# Patient Record
Sex: FEMALE | Race: WHITE | Employment: UNEMPLOYED | URBAN - METROPOLITAN AREA
[De-identification: names, ages, dates, MRNs, and addresses within clinical notes are randomized per-mention and may not be internally consistent; named-entity substitution may affect disease eponyms.]

---

## 2023-11-06 PROBLEM — I31.9: Status: ACTIVE | Noted: 2023-11-06

## 2023-11-06 PROBLEM — I42.0 DILATED CARDIOMYOPATHY (MULTI): Status: ACTIVE | Noted: 2023-11-06

## 2023-11-06 PROBLEM — R07.89 CHEST DISCOMFORT: Status: ACTIVE | Noted: 2023-11-06

## 2023-11-06 PROBLEM — I10 ESSENTIAL (PRIMARY) HYPERTENSION: Status: ACTIVE | Noted: 2023-11-06

## 2023-11-06 RX ORDER — NADOLOL 40 MG/1
TABLET ORAL
COMMUNITY

## 2023-11-06 RX ORDER — OXCARBAZEPINE 150 MG/1
1 TABLET, FILM COATED ORAL DAILY
COMMUNITY
End: 2023-11-07 | Stop reason: WASHOUT

## 2023-11-06 RX ORDER — CALCIUM CARBONATE/VITAMIN D3 500-10/5ML
LIQUID (ML) ORAL
COMMUNITY

## 2023-11-06 RX ORDER — ACETAMINOPHEN 500 MG
TABLET ORAL
COMMUNITY

## 2023-11-06 RX ORDER — FOLIC ACID 1 MG/1
1 TABLET ORAL DAILY
COMMUNITY

## 2023-11-06 RX ORDER — ONDANSETRON 4 MG/1
TABLET, FILM COATED ORAL
COMMUNITY

## 2023-11-06 RX ORDER — CYCLOBENZAPRINE HCL 10 MG
TABLET ORAL
COMMUNITY

## 2023-11-06 RX ORDER — FAMOTIDINE 20 MG/1
TABLET, FILM COATED ORAL
COMMUNITY

## 2023-11-06 RX ORDER — IBUPROFEN 100 MG/5ML
SUSPENSION, ORAL (FINAL DOSE FORM) ORAL
COMMUNITY

## 2023-11-06 RX ORDER — LEVOTHYROXINE SODIUM 75 UG/1
1 TABLET ORAL DAILY
COMMUNITY

## 2023-11-06 RX ORDER — AMITRIPTYLINE HYDROCHLORIDE 50 MG/1
TABLET, FILM COATED ORAL
COMMUNITY

## 2023-11-06 RX ORDER — FLUOXETINE HYDROCHLORIDE 20 MG/1
CAPSULE ORAL
COMMUNITY
End: 2023-11-07 | Stop reason: WASHOUT

## 2023-11-06 RX ORDER — SULFAMETHOXAZOLE AND TRIMETHOPRIM 800; 160 MG/1; MG/1
TABLET ORAL
COMMUNITY

## 2023-11-06 RX ORDER — HYDROXYZINE HYDROCHLORIDE 25 MG/1
TABLET, FILM COATED ORAL
COMMUNITY
End: 2023-11-07 | Stop reason: WASHOUT

## 2023-11-06 RX ORDER — LORATADINE 10 MG/1
CAPSULE, LIQUID FILLED ORAL
COMMUNITY

## 2023-11-06 RX ORDER — SPIRONOLACTONE 50 MG/1
1 TABLET, FILM COATED ORAL DAILY
COMMUNITY
Start: 2022-01-24

## 2023-11-06 RX ORDER — LEVONORGESTREL 52 MG/1
INTRAUTERINE DEVICE INTRAUTERINE
COMMUNITY

## 2023-11-06 RX ORDER — DOCUSATE SODIUM 100 MG/1
CAPSULE, LIQUID FILLED ORAL
COMMUNITY

## 2023-11-06 RX ORDER — AMLODIPINE BESYLATE 5 MG/1
1 TABLET ORAL DAILY
COMMUNITY

## 2023-11-06 RX ORDER — POTASSIUM CHLORIDE 20 MEQ/1
TABLET, EXTENDED RELEASE ORAL
COMMUNITY

## 2023-11-06 RX ORDER — CHLORTHALIDONE 25 MG/1
1 TABLET ORAL DAILY
COMMUNITY

## 2023-11-06 RX ORDER — IBUPROFEN 600 MG/1
TABLET ORAL
COMMUNITY

## 2023-11-07 ENCOUNTER — OFFICE VISIT (OUTPATIENT)
Dept: CARDIOLOGY | Facility: CLINIC | Age: 43
End: 2023-11-07
Payer: COMMERCIAL

## 2023-11-07 VITALS
HEIGHT: 63 IN | SYSTOLIC BLOOD PRESSURE: 106 MMHG | HEART RATE: 96 BPM | WEIGHT: 157 LBS | BODY MASS INDEX: 27.82 KG/M2 | TEMPERATURE: 97.9 F | DIASTOLIC BLOOD PRESSURE: 69 MMHG

## 2023-11-07 DIAGNOSIS — R07.89 OTHER CHEST PAIN: ICD-10-CM

## 2023-11-07 DIAGNOSIS — I30.9 ACUTE PERICARDITIS, UNSPECIFIED TYPE (HHS-HCC): Primary | ICD-10-CM

## 2023-11-07 DIAGNOSIS — R06.02 SHORTNESS OF BREATH: ICD-10-CM

## 2023-11-07 PROCEDURE — 1036F TOBACCO NON-USER: CPT | Performed by: NURSE PRACTITIONER

## 2023-11-07 PROCEDURE — 3074F SYST BP LT 130 MM HG: CPT | Performed by: NURSE PRACTITIONER

## 2023-11-07 PROCEDURE — 99214 OFFICE O/P EST MOD 30 MIN: CPT | Performed by: NURSE PRACTITIONER

## 2023-11-07 PROCEDURE — 3078F DIAST BP <80 MM HG: CPT | Performed by: NURSE PRACTITIONER

## 2023-11-07 RX ORDER — COLCHICINE 0.6 MG/1
0.6 TABLET ORAL 2 TIMES DAILY
Qty: 180 TABLET | Refills: 0 | Status: SHIPPED | OUTPATIENT
Start: 2023-11-07 | End: 2023-12-18 | Stop reason: SDUPTHER

## 2023-11-07 RX ORDER — OMEPRAZOLE 20 MG/1
40 TABLET, DELAYED RELEASE ORAL
Qty: 28 TABLET | Refills: 0 | Status: SHIPPED | OUTPATIENT
Start: 2023-11-07 | End: 2023-12-18 | Stop reason: WASHOUT

## 2023-11-07 RX ORDER — IBUPROFEN 800 MG/1
800 TABLET ORAL EVERY 8 HOURS PRN
Qty: 42 TABLET | Refills: 0 | Status: SHIPPED | OUTPATIENT
Start: 2023-11-07 | End: 2023-11-21

## 2023-11-07 NOTE — PROGRESS NOTES
"Chief Complaint:   No chief complaint on file.     History Of Present Illness:    Roxane Lees is a 43 y.o. female here with Chest Pain.  On chemo injection: vasculitis. It is working.    Feels like has pericarditis. Symptoms consistent with when she had pericarditis in the past.   She c/o chest pressure. Feels like someone is pushing on her chest. Reports an increase in SOB. Inhaler has not helped symptoms. Reports that chest pain is worse with laying down. Sitting certain ways reports an increase in SOB. Denies fever.     Denies LE edema, palpitations and syncope.      Allergies:  Patient has no known allergies.    Review of Systems  All pertinent systems have been reviewed and are negative except for what is stated in the history of present illness.    All other systems have been reviewed and are negative and noncontributory to this patient's current ailments.     Visit Vitals  /69 (BP Location: Right arm)   Pulse 96   Temp 36.6 °C (97.9 °F)   Ht 1.6 m (5' 3\")   Wt 71.2 kg (157 lb)   BMI 27.81 kg/m²   Smoking Status Never   BSA 1.78 m²       Objective   Vitals reviewed.   Constitutional:       Appearance: Healthy appearance. Not in distress.   Neck:      Vascular: No JVR. JVD normal.   Pulmonary:      Effort: Pulmonary effort is normal.      Breath sounds: Normal breath sounds. No wheezing. No rhonchi. No rales.   Chest:      Chest wall: Not tender to palpatation.   Cardiovascular:      PMI at left midclavicular line. Normal rate. Regular rhythm. Normal S1. Normal S2.       Murmurs: There is no murmur.      No gallop.  No click. No rub.   Edema:     Peripheral edema absent.   Abdominal:      General: Bowel sounds are normal.      Palpations: Abdomen is soft.      Tenderness: There is no abdominal tenderness.   Musculoskeletal: Normal range of motion.         General: No tenderness. Skin:     General: Skin is warm and dry.   Neurological:      General: No focal deficit present.      Mental Status: Alert and " oriented to person, place and time.       Assessment/Plan   Diagnoses and all orders for this visit:  Acute pericarditis, unspecified type  - Esed, CRP and WBC elevated consistent with pericarditis  - no rub ausculted on exam  - no evidence of tamponade  - starting ibuprofen 800mg TID for 2 weeks with omeprazole  - starting colchicine 0.6mg BID for 3 months  - has had pericarditis in the past  - if continues to re-occur we may want to keep her on long term colchicine   Other chest pain  - 2/2 pericarditis  Shortness of breath  - suspect due to pericarditis and asthma  - cannot exclude other CV cause as patient is on chronic chemo  - we will check echo    Outpatient Medications:  Current Outpatient Medications   Medication Instructions    amitriptyline (Elavil) 50 mg tablet oral    amLODIPine (Norvasc) 5 mg tablet 1 tablet, oral, Daily    ascorbic acid (Vitamin C) 1,000 mg tablet oral    cetirizine (ZyrTEC) 10 mg capsule oral    chlorthalidone (Hygroton) 25 mg tablet 1 tablet, oral, Daily    cholecalciferol (Vitamin D-3) 50 mcg (2,000 unit) capsule oral    colchicine 0.6 mg, oral, 2 times daily    cyclobenzaprine (Flexeril) 10 mg tablet oral    docusate sodium (Colace) 100 mg capsule oral    famotidine (Pepcid) 20 mg tablet oral    folic acid (Folvite) 1 mg tablet 1 tablet, oral, Daily    ibuprofen 600 mg tablet oral    ibuprofen 800 mg, oral, Every 8 hours PRN    levonorgestrel (Mirena) 21 mcg/24 hours (8 yrs) 52 mg IUD     levothyroxine (Synthroid) 75 mcg tablet 1 tablet, oral, Daily    linaCLOtide (Linzess) 145 mcg capsule oral    loratadine (Claritin Liqui-Gel) 10 mg capsule oral    magnesium oxide 400 mg magnesium capsule oral    nadolol (Corgard) 40 mg tablet oral    omeprazole OTC (PRILOSEC OTC) 40 mg, oral, Daily before breakfast, Do not crush, chew, or split.    ondansetron (Zofran) 4 mg tablet oral    potassium chloride CR 20 mEq ER tablet oral    spironolactone (Aldactone) 50 mg tablet 1 tablet, oral,  Daily    sulfamethoxazole-trimethoprim (Bactrim DS) 800-160 mg tablet oral

## 2023-11-08 DIAGNOSIS — I31.9 DISEASE OF PERICARDIUM, UNSPECIFIED (HHS-HCC): Primary | ICD-10-CM

## 2023-11-08 RX ORDER — OMEPRAZOLE 40 MG/1
40 CAPSULE, DELAYED RELEASE ORAL
Qty: 30 CAPSULE | Refills: 11 | Status: SHIPPED | OUTPATIENT
Start: 2023-11-08 | End: 2023-12-18 | Stop reason: WASHOUT

## 2023-11-21 ENCOUNTER — HOSPITAL ENCOUNTER (OUTPATIENT)
Dept: CARDIOLOGY | Facility: CLINIC | Age: 43
Discharge: HOME | End: 2023-11-21
Payer: COMMERCIAL

## 2023-11-21 ENCOUNTER — OFFICE VISIT (OUTPATIENT)
Dept: CARDIOLOGY | Facility: CLINIC | Age: 43
End: 2023-11-21
Payer: COMMERCIAL

## 2023-11-21 VITALS
WEIGHT: 157.8 LBS | BODY MASS INDEX: 27.96 KG/M2 | HEART RATE: 97 BPM | DIASTOLIC BLOOD PRESSURE: 77 MMHG | HEIGHT: 63 IN | SYSTOLIC BLOOD PRESSURE: 119 MMHG

## 2023-11-21 DIAGNOSIS — I30.9 ACUTE PERICARDITIS, UNSPECIFIED TYPE (HHS-HCC): ICD-10-CM

## 2023-11-21 DIAGNOSIS — I30.9 ACUTE PERICARDITIS, UNSPECIFIED TYPE (HHS-HCC): Primary | ICD-10-CM

## 2023-11-21 DIAGNOSIS — I10 ESSENTIAL (PRIMARY) HYPERTENSION: ICD-10-CM

## 2023-11-21 DIAGNOSIS — R07.9 CHEST PAIN, UNSPECIFIED: ICD-10-CM

## 2023-11-21 DIAGNOSIS — I32 PERICARDITIS IN DISEASES CLASSIFIED ELSEWHERE (HHS-HCC): ICD-10-CM

## 2023-11-21 LAB
AORTIC VALVE PEAK VELOCITY: 1.22
AV PEAK GRADIENT: 6
AVA (PEAK VEL): 2.57
EJECTION FRACTION APICAL 4 CHAMBER: 71.5
EJECTION FRACTION: 69
GLOBAL LONGITUDINAL STRAIN: -19.5
LEFT ATRIUM VOLUME AREA LENGTH INDEX BSA: 13.6
LEFT VENTRICLE INTERNAL DIMENSION DIASTOLE: 4.59 (ref 3.5–6)
LEFT VENTRICULAR OUTFLOW TRACT DIAMETER: 2.06
MITRAL VALVE E/A RATIO: 0.91
MITRAL VALVE E/E' RATIO: 6.03
RIGHT VENTRICLE FREE WALL PEAK S': 12
RIGHT VENTRICLE PEAK SYSTOLIC PRESSURE: 21.1
TRICUSPID ANNULAR PLANE SYSTOLIC EXCURSION: 1.9

## 2023-11-21 PROCEDURE — 1036F TOBACCO NON-USER: CPT | Performed by: NURSE PRACTITIONER

## 2023-11-21 PROCEDURE — 93306 TTE W/DOPPLER COMPLETE: CPT

## 2023-11-21 PROCEDURE — 3074F SYST BP LT 130 MM HG: CPT | Performed by: NURSE PRACTITIONER

## 2023-11-21 PROCEDURE — 99214 OFFICE O/P EST MOD 30 MIN: CPT | Mod: 25 | Performed by: NURSE PRACTITIONER

## 2023-11-21 PROCEDURE — 93306 TTE W/DOPPLER COMPLETE: CPT | Performed by: INTERNAL MEDICINE

## 2023-11-21 PROCEDURE — 99214 OFFICE O/P EST MOD 30 MIN: CPT | Performed by: NURSE PRACTITIONER

## 2023-11-21 PROCEDURE — 3078F DIAST BP <80 MM HG: CPT | Performed by: NURSE PRACTITIONER

## 2023-11-21 RX ORDER — TOBRAMYCIN AND DEXAMETHASONE 3; 1 MG/ML; MG/ML
1 SUSPENSION/ DROPS OPHTHALMIC 4 TIMES DAILY
COMMUNITY
Start: 2023-11-13 | End: 2023-12-18 | Stop reason: WASHOUT

## 2023-11-21 NOTE — PROGRESS NOTES
"Chief Complaint:   Pericarditis      History Of Present Illness:    Roxane Lees is a 43 y.o. female here with pericarditis. 2 weeks ago patient presented with symptoms consistent with when she had pericarditis in the past.   She c/o chest pressure. Felt like someone is pushing on her chest. Reports an increase in SOB. Inhaler has not helped symptoms. Chest pain is worse with laying down. Sitting certain ways reports an increase in SOB. Denies fever. I started her on ibuprofen and colchicine. She is here for follow up.     Doing mildly better.   Chest pain mildly improved.   Notes more fatigue.   SOB remains, very mildly improved.     Allergies:  Codeine and Vicodin [hydrocodone-acetaminophen]    Review of Systems  All pertinent systems have been reviewed and are negative except for what is stated in the history of present illness.    All other systems have been reviewed and are negative and noncontributory to this patient's current ailments.     Visit Vitals  /77 (BP Location: Right arm, Patient Position: Sitting, BP Cuff Size: Adult)   Pulse 97   Ht 1.6 m (5' 3\")   Wt 71.6 kg (157 lb 12.8 oz)   BMI 27.95 kg/m²   Smoking Status Never   BSA 1.78 m²         Objective   Vitals reviewed.   Constitutional:       Appearance: Healthy appearance. Not in distress.   Neck:      Vascular: No JVR. JVD normal.   Pulmonary:      Effort: Pulmonary effort is normal.      Breath sounds: Normal breath sounds. No wheezing. No rhonchi. No rales.   Chest:      Chest wall: Not tender to palpatation.   Cardiovascular:      PMI at left midclavicular line. Normal rate. Regular rhythm. Normal S1. Normal S2.       Murmurs: There is no murmur.      No gallop.  No click. No rub.   Edema:     Peripheral edema absent.   Abdominal:      General: Bowel sounds are normal.      Palpations: Abdomen is soft.      Tenderness: There is no abdominal tenderness.   Musculoskeletal: Normal range of motion.         General: No tenderness. Skin:     " General: Skin is warm and dry.   Neurological:      General: No focal deficit present.      Mental Status: Alert and oriented to person, place and time.       Assessment/Plan   Diagnoses and all orders for this visit:  Acute pericarditis, unspecified type  - Esed, CRP and WBC elevated consistent with pericarditis  - no rub ausculted on exam  - no evidence of tamponade  - finish ibuprofen 800mg TID for 2 weeks with omeprazole  - continue colchicine 0.6mg BID for 3 months  - has had pericarditis in the past  - considering arcalyst due to recurrent pericarditis. She will discuss with vascular first.   Other chest pain  - 2/2 pericarditis  Shortness of breath  - suspect due to pericarditis and asthma  - s/p echo today    Follow up in 1 month    Outpatient Medications:  Current Outpatient Medications   Medication Instructions    amitriptyline (Elavil) 50 mg tablet oral    amLODIPine (Norvasc) 5 mg tablet 1 tablet, oral, Daily    ascorbic acid (Vitamin C) 1,000 mg tablet oral    cetirizine (ZyrTEC) 10 mg capsule oral    chlorthalidone (Hygroton) 25 mg tablet 1 tablet, oral, Daily    cholecalciferol (Vitamin D-3) 50 mcg (2,000 unit) capsule oral    colchicine 0.6 mg, oral, 2 times daily    cyclobenzaprine (Flexeril) 10 mg tablet oral    docusate sodium (Colace) 100 mg capsule oral    famotidine (Pepcid) 20 mg tablet oral    folic acid (Folvite) 1 mg tablet 1 tablet, oral, Daily    ibuprofen 600 mg tablet oral    ibuprofen 800 mg, oral, Every 8 hours PRN    levonorgestrel (Mirena) 21 mcg/24 hours (8 yrs) 52 mg IUD     levothyroxine (Synthroid) 75 mcg tablet 1 tablet, oral, Daily    linaCLOtide (Linzess) 145 mcg capsule oral    loratadine (Claritin Liqui-Gel) 10 mg capsule oral    magnesium oxide 400 mg magnesium capsule oral    nadolol (Corgard) 40 mg tablet oral    omeprazole (PRILOSEC) 40 mg, oral, Daily before breakfast, Do not crush or chew.    omeprazole OTC (PRILOSEC OTC) 40 mg, oral, Daily before breakfast, Do not  crush, chew, or split.    ondansetron (Zofran) 4 mg tablet oral    potassium chloride CR 20 mEq ER tablet oral    spironolactone (Aldactone) 50 mg tablet 1 tablet, oral, Daily    sulfamethoxazole-trimethoprim (Bactrim DS) 800-160 mg tablet oral

## 2023-11-27 ENCOUNTER — TELEPHONE (OUTPATIENT)
Dept: CARDIOLOGY | Facility: CLINIC | Age: 43
End: 2023-11-27
Payer: COMMERCIAL

## 2023-12-18 ENCOUNTER — OFFICE VISIT (OUTPATIENT)
Dept: CARDIOLOGY | Facility: CLINIC | Age: 43
End: 2023-12-18
Payer: COMMERCIAL

## 2023-12-18 VITALS
SYSTOLIC BLOOD PRESSURE: 134 MMHG | HEIGHT: 63 IN | HEART RATE: 123 BPM | TEMPERATURE: 97.7 F | BODY MASS INDEX: 28.17 KG/M2 | DIASTOLIC BLOOD PRESSURE: 86 MMHG | WEIGHT: 159 LBS

## 2023-12-18 DIAGNOSIS — R06.02 SHORTNESS OF BREATH: ICD-10-CM

## 2023-12-18 DIAGNOSIS — R07.89 OTHER CHEST PAIN: ICD-10-CM

## 2023-12-18 DIAGNOSIS — I30.9 ACUTE PERICARDITIS, UNSPECIFIED TYPE (HHS-HCC): Primary | ICD-10-CM

## 2023-12-18 PROCEDURE — 99213 OFFICE O/P EST LOW 20 MIN: CPT | Performed by: NURSE PRACTITIONER

## 2023-12-18 PROCEDURE — 3079F DIAST BP 80-89 MM HG: CPT | Performed by: NURSE PRACTITIONER

## 2023-12-18 PROCEDURE — 1036F TOBACCO NON-USER: CPT | Performed by: NURSE PRACTITIONER

## 2023-12-18 PROCEDURE — 3075F SYST BP GE 130 - 139MM HG: CPT | Performed by: NURSE PRACTITIONER

## 2023-12-18 RX ORDER — COLCHICINE 0.6 MG/1
0.6 TABLET ORAL 2 TIMES DAILY
Qty: 180 TABLET | Refills: 0 | Status: SHIPPED | OUTPATIENT
Start: 2023-12-18 | End: 2024-03-17

## 2023-12-18 NOTE — PROGRESS NOTES
Chief Complaint:   Pericarditis      History Of Present Illness:    Roxane Lees is a 43 y.o. female here with pericarditis. 2 weeks ago patient presented with symptoms consistent with when she had pericarditis in the past.   She c/o chest pressure. Felt like someone is pushing on her chest. Reports an increase in SOB. Inhaler has not helped symptoms. Chest pain is worse with laying down. Sitting certain ways reports an increase in SOB. Denies fever. I started her on ibuprofen and colchicine. She is here for follow up.     Muscle burn/ aching, fatigue. Over the past week.     Still has ongoing chest discomfort, it has improved. SOB unchanged.     Doing mildly better.   Chest pain mildly improved.   Notes more fatigue.   SOB remains, very mildly improved.     Allergies:  Codeine and Vicodin [hydrocodone-acetaminophen]    Review of Systems  All pertinent systems have been reviewed and are negative except for what is stated in the history of present illness.    All other systems have been reviewed and are negative and noncontributory to this patient's current ailments.     Objective   Vitals reviewed.   Constitutional:       Appearance: Healthy appearance. Not in distress.   Neck:      Vascular: No JVR. JVD normal.   Pulmonary:      Effort: Pulmonary effort is normal.      Breath sounds: Normal breath sounds. No wheezing. No rhonchi. No rales.   Chest:      Chest wall: Not tender to palpatation.   Cardiovascular:      PMI at left midclavicular line. Normal rate. Regular rhythm. Normal S1. Normal S2.       Murmurs: There is no murmur.      No gallop.  No click. No rub.   Edema:     Peripheral edema absent.   Abdominal:      General: Bowel sounds are normal.      Palpations: Abdomen is soft.      Tenderness: There is no abdominal tenderness.   Musculoskeletal: Normal range of motion.         General: No tenderness. Skin:     General: Skin is warm and dry.   Neurological:      General: No focal deficit present.       Mental Status: Alert and oriented to person, place and time.       Assessment/Plan   Diagnoses and all orders for this visit:  Acute pericarditis, unspecified type  - improved,  - no rub ausculted on exam  - no evidence of tamponade  - continue colchicine 0.6mg BID for 3 months  - has had pericarditis in the past  - would recommend starting arcalyst due to recurrent pericarditis- she will discuss with rheumatology. Do not want to start without rheumatology approval.   Other chest pain  - 2/2 pericarditis  - improving   Shortness of breath  -  echo WNL  - improving      Current Outpatient Medications:     amitriptyline (Elavil) 50 mg tablet, Take by mouth., Disp: , Rfl:     amLODIPine (Norvasc) 5 mg tablet, Take 1 tablet (5 mg) by mouth once daily., Disp: , Rfl:     ascorbic acid (Vitamin C) 1,000 mg tablet, Take by mouth., Disp: , Rfl:     cetirizine (ZyrTEC) 10 mg capsule, Take by mouth., Disp: , Rfl:     chlorthalidone (Hygroton) 25 mg tablet, Take 1 tablet (25 mg) by mouth once daily., Disp: , Rfl:     cholecalciferol (Vitamin D-3) 50 mcg (2,000 unit) capsule, Take by mouth., Disp: , Rfl:     cyclobenzaprine (Flexeril) 10 mg tablet, Take by mouth., Disp: , Rfl:     docusate sodium (Colace) 100 mg capsule, Take by mouth., Disp: , Rfl:     famotidine (Pepcid) 20 mg tablet, Take by mouth., Disp: , Rfl:     folic acid (Folvite) 1 mg tablet, Take 1 tablet (1 mg) by mouth once daily., Disp: , Rfl:     ibuprofen 600 mg tablet, Take by mouth., Disp: , Rfl:     levonorgestrel (Mirena) 21 mcg/24 hours (8 yrs) 52 mg IUD, , Disp: , Rfl:     levothyroxine (Synthroid) 75 mcg tablet, Take 1 tablet (75 mcg) by mouth once daily., Disp: , Rfl:     linaCLOtide (Linzess) 145 mcg capsule, Take by mouth., Disp: , Rfl:     loratadine (Claritin Liqui-Gel) 10 mg capsule, Take by mouth., Disp: , Rfl:     magnesium oxide 400 mg magnesium capsule, Take by mouth., Disp: , Rfl:     nadolol (Corgard) 40 mg tablet, Take by mouth., Disp: , Rfl:      ondansetron (Zofran) 4 mg tablet, Take by mouth., Disp: , Rfl:     potassium chloride CR 20 mEq ER tablet, Take by mouth., Disp: , Rfl:     spironolactone (Aldactone) 50 mg tablet, Take 1 tablet (50 mg) by mouth once daily., Disp: , Rfl:     sulfamethoxazole-trimethoprim (Bactrim DS) 800-160 mg tablet, Take by mouth., Disp: , Rfl:     colchicine 0.6 mg tablet, Take 1 tablet (0.6 mg) by mouth 2 times a day., Disp: 180 tablet, Rfl: 0

## 2024-02-29 DIAGNOSIS — I30.9 ACUTE PERICARDITIS, UNSPECIFIED TYPE (HHS-HCC): ICD-10-CM

## 2024-03-04 RX ORDER — IBUPROFEN 800 MG/1
800 TABLET ORAL EVERY 8 HOURS PRN
Qty: 42 TABLET | Refills: 0 | OUTPATIENT
Start: 2024-03-04 | End: 2024-03-18

## 2024-07-19 DIAGNOSIS — I30.9 ACUTE PERICARDITIS, UNSPECIFIED TYPE (HHS-HCC): ICD-10-CM

## 2024-07-22 ENCOUNTER — PATIENT MESSAGE (OUTPATIENT)
Dept: CARDIOLOGY | Facility: CLINIC | Age: 44
End: 2024-07-22
Payer: COMMERCIAL

## 2024-07-22 DIAGNOSIS — I31.9 DISEASE OF PERICARDIUM, UNSPECIFIED (HHS-HCC): Primary | ICD-10-CM

## 2024-07-22 RX ORDER — COLCHICINE 0.6 MG/1
0.6 TABLET ORAL 2 TIMES DAILY
Qty: 180 TABLET | Refills: 0 | OUTPATIENT
Start: 2024-07-22 | End: 2024-10-20

## 2024-07-22 RX ORDER — IBUPROFEN 800 MG/1
800 TABLET ORAL EVERY 8 HOURS PRN
Qty: 42 TABLET | Refills: 0 | OUTPATIENT
Start: 2024-07-22 | End: 2024-08-05

## 2024-07-23 RX ORDER — COLCHICINE 0.6 MG/1
0.6 TABLET ORAL 2 TIMES DAILY
COMMUNITY
Start: 2024-05-02 | End: 2024-07-23 | Stop reason: SDUPTHER

## 2024-07-23 RX ORDER — COLCHICINE 0.6 MG/1
0.6 TABLET ORAL 2 TIMES DAILY
Qty: 90 TABLET | Refills: 2 | Status: SHIPPED | OUTPATIENT
Start: 2024-07-23